# Patient Record
Sex: MALE | Race: BLACK OR AFRICAN AMERICAN
[De-identification: names, ages, dates, MRNs, and addresses within clinical notes are randomized per-mention and may not be internally consistent; named-entity substitution may affect disease eponyms.]

---

## 2018-04-30 ENCOUNTER — HOSPITAL ENCOUNTER (EMERGENCY)
Dept: HOSPITAL 57 - BURERS | Age: 38
Discharge: HOME | End: 2018-04-30
Payer: SELF-PAY
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
## 2018-04-30 DIAGNOSIS — F43.10: ICD-10-CM
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
## 2018-04-30 DIAGNOSIS — S83.92XA: Primary | ICD-10-CM

## 2018-04-30 NOTE — RAD
4 VIEWS LEFT KNEE:

 

Date:  04/30/18 

 

INDICATION:

Left knee injury while playing basketball. 

 

FINDINGS:

There is a mildly depressed posterior and lateral tibial plateau fracture with estimated impression o
f the posterior and lateral aspect of the lateral tibial plateau surface of approximately 6.4 mm. The
re is mild osteoarthrosis of the left knee, affecting all major compartments, but most severely affec
ting the patellofemoral and medial femorotibial joint compartment. There is joint capsular distention
. 

 

IMPRESSION: 

1.  Mild to moderately depressed left lateral tibial plateau fracture. 

2.  Mild to moderate osteoarthrosis of the left knee. 

 

The patient would benefit from a CT examination of the left knee to evaluate extent of articular surf
ace injury of the lateral tibial plateau. 

 

 

POS: ROBERT